# Patient Record
Sex: FEMALE | Race: OTHER
[De-identification: names, ages, dates, MRNs, and addresses within clinical notes are randomized per-mention and may not be internally consistent; named-entity substitution may affect disease eponyms.]

---

## 2018-05-08 ENCOUNTER — HOSPITAL ENCOUNTER (OUTPATIENT)
Dept: HOSPITAL 80 - FED | Age: 28
Setting detail: OBSERVATION
Discharge: HOME | End: 2018-05-08
Attending: ORTHOPAEDIC SURGERY | Admitting: ORTHOPAEDIC SURGERY
Payer: COMMERCIAL

## 2018-05-08 VITALS — DIASTOLIC BLOOD PRESSURE: 75 MMHG | SYSTOLIC BLOOD PRESSURE: 117 MMHG
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
## 2018-05-08 DIAGNOSIS — S52.321B: Primary | ICD-10-CM

## 2018-05-08 DIAGNOSIS — S52.221B: ICD-10-CM
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
## 2018-05-08 LAB — PLATELET # BLD: 255 10^3/UL (ref 150–400)

## 2018-05-08 PROCEDURE — 73090 X-RAY EXAM OF FOREARM: CPT

## 2018-05-08 PROCEDURE — C1713 ANCHOR/SCREW BN/BN,TIS/BN: HCPCS

## 2018-05-08 PROCEDURE — 25505 CLTX RDL SHFT FX W/MNPJ: CPT

## 2018-05-08 PROCEDURE — 11010 DEBRIDE SKIN AT FX SITE: CPT

## 2018-05-08 PROCEDURE — L3980 UP EXT FX ORTHOS HUMERAL NOS: HCPCS

## 2018-05-08 PROCEDURE — G0378 HOSPITAL OBSERVATION PER HR: HCPCS

## 2018-05-08 PROCEDURE — 25575 OPTX RDL&ULN SHFT FX RDS&ULN: CPT

## 2018-05-08 RX ADMIN — HYDROCODONE BITARTRATE AND ACETAMINOPHEN PRN TAB: 5; 325 TABLET ORAL at 15:50

## 2018-05-08 RX ADMIN — Medication PRN MCG: at 15:39

## 2018-05-08 RX ADMIN — HYDROCODONE BITARTRATE AND ACETAMINOPHEN PRN TAB: 5; 325 TABLET ORAL at 16:41

## 2018-05-08 RX ADMIN — Medication PRN MCG: at 15:17

## 2018-05-08 NOTE — POSTANESTH
Post Anesthetic Evaluation


Cardiovascular Status: Similar to Pre-Op Cond


Respiratory Status: Similar to Pre-op Cond.


Level of Consciousness/Mental Status: Alert and Oriented, Mildly Sleepy, 

Arousable


Pain Control: Adequate, Prn Tx Ordered


Nausea/Vomiting Control: Adequate, Prn Tx Ordered


Complications Possibly Related to Anesthesia: None Noted

## 2018-05-08 NOTE — GOP
[f rep st]



                                                                OPERATIVE REPORT





DATE OF OPERATION:  05/08/2018



SURGEON:  Navid Perez MD



ASSISTANT:  Douglas Quiñonez, medically required for positioning of the arm and careful retraction of vit
al neurovascular structures during open reduction and internal fixation of both-bone forearm fracture
.



PREOPERATIVE DIAGNOSIS:  Grade 1 open both-bone forearm fracture with ulnar-sided poke hole.



POSTOPERATIVE DIAGNOSIS:  Grade 1 open both-bone forearm fracture with ulnar-sided poke hole.



PROCEDURE PERFORMED:  

1.  Open reduction and internal fixation of both-bone forearm fracture.

2.  Irrigation and debridement of 1 cm wound, ulnar side, forearm.

3.  Intraoperative fluoroscopy performed and interpreted by surgeon.



FINDINGS:  





INDICATIONS:  27-year-old female had a mechanical fall.  Inside-out poke hole on the ulnar side washe
d out and reduced in the emergency room, presents for definitive irrigation, debridement and fixation
.



DESCRIPTION OF PROCEDURE:  Patient identified in the preoperative holding area. Consent, laterality a
nd preoperative antibiotics were confirmed and delivered.  All questions were answered.  Her  
was at bedside.  She was able to wiggle her 4 fingers.  No pain with passive range of motion of her t
humb or fingers.  Splint was well approximated. 



The patient brought into the operating room, transferred over to the OR table.  General anesthesia.  
All extremities well padded.  Hand table was used.  The right upper extremity was prepped and draped 
in the usual sterile fashion.  Surgical time-out was performed.  The forearm was soft and thus we dec
ided to proceed with the surgery.  There was a 1 cm transverse wound on the volar ulnar aspect of her
 forearm midway down.  We extended this a few millimeters in each direction and washed it out, used a
 small curette, it looked fairly clean.  We used 3000 L of polymyxin bacitracin for this one single w
ound.  Then, we went ahead with a volar Chandler approach.  We made an incision from the mid aspect of h
er forearm up just distal to the antecubital fossa.  Developed the subcutaneous tissue flaps.  Took t
he fascia in line.  We found brachioradialis and the FCR pronator teres interval, and the fracture he
matoma was identified.  The radial nerve was taken radially.  We did not visualize the radial artery 
or the recurrent leash.  We went down to bone and found the supinator and pronator teres, and in supi
nation, we took down the supinator and then with slight pronation, we took down pronator teres and de
veloped the fracture site.  The most distal fragment was attached to pronator teres.  We reduced this
 fracture and placed a 6 hole plate.  Confirmed this under fluoroscopic views.  We washed out the wou
nd with 500 cc of warm normal saline.  Went to the ulnar incision.  Just a little bit distal on the s
ubcutaneous border, we took down FCU to visualize the fracture.  There was a butterfly fragment.  We 
used pointed reduction clamps for provisional fixation and then placed the 6 hole plate.  Final fluor
oscopic views in AP and lateral views show anatomic reduction.  Clinically, she had about 70 degrees 
of supination and approximately 70 degrees of pronation.  We washed out all the wounds with 500 cc of
 warm normal saline, 3-0 Monocryl for closure and 3-0 Prolene for skin.  Mastisol and Steri-Strips we
re used.  Xeroform, 4 x 4's, ABD, and a well-padded sugar-tong splint was used to the metacarpal head
s. 



Tourniquet time was let down after 75 minutes. 



The patient was extubated awake to the PACU in stable condition.



IMPLANTS USED:  Two 6-hole 3.5 LCDC plates.





Job #:  [id number]

## 2018-05-08 NOTE — PDGENHP
History and Physical


History and Physical: 





films reviewed


discussed case with ER


Plan for I and D and ORIF BBFA Fx today

## 2018-05-08 NOTE — GCON
[f rep st]



                                                                    CONSULTATION





ORTHOPEDIC EMERGENCY ROOM CONSULTATION



DATE OF CONSULTATION:  05/08/2018





CHIEF COMPLAINT:  Right forearm pain.



DIAGNOSES:  Grade 1 open both-bone forearm fracture.



HISTORY OF PRESENT ILLNESS:  The patient is a 27-year-old female who, in the middle of the night last
 night, tripped and fell.  Possibly intoxicated.  Unable to use the right arm.  Was seen in the emerg
ency room.  Close reduced.  Found to have some bleeding from a tiny poke hole on the forearm, less th
an 5 mm.  She had antibiotics, tetanus up to date, and washed out in the emergency room and splinted.




ORTHOPEDIC EXAMINATION:  EXTREMITIES:  Reveals a well-approximated splint.  Mobile fingers but painfu
l.  Sensate over her thumb and index finger and 1st dorsal web space.  Brisk cap refill.  Bilateral l
ower extremities, full motion without pain.  Left upper extremity, full motion without pain.  ABDOMEN
:  Soft.  PELVIS:  Stable.



IMAGING:  X-rays reviewed reveal a both-bone forearm fracture.  Slightly proximal to the midshaft.



IMPRESSION/RECOMMENDATION:  Reportedly open both-bone forearm fracture.  Recommend a washout today.  
Plans for open reduction, internal fixation.  However, if she is too swollen, we will go ahead and de
lay this and just treat the open aspect for the time being.  Risks, benefits, expectations, and alter
natives were discussed.  The patient agrees and consents to treatment.  Her  was at the [facility] for questions and answers.





Job #:  [id number]

## 2018-05-08 NOTE — PDANEPAE
ANE History of Present Illness





Forearm fracture from fall





ANE Past Medical History





- Pulmonary History


Hx Oxygen in Use at Home: No


Hx Sleep Apnea: No


Sleep Apnea Screening Result - Last Documented: Positive





- Endocrine History


Hx Diabetes: No





ANE Review of Systems


Review of Systems: 








ANE Patient History





- Allergies


Allergies/Adverse Reactions: 








No Known Allergies Allergy (Unverified 05/08/18 01:01)


 








- Home Medications


Home Medications: 








NK [No Known Home Meds]  05/08/18 [Last Taken Unknown]








- NPO status


NPO Since - Liquids (Date): 05/08/18


NPO Since - Liquids (Time): 03:00


NPO Since - Solids (Date): 05/07/18


NPO Since - Solids (Time): 20:00





- Anes Hx


Anes Hx: no prior problems





- Smoking Hx


Smoking Status: Never smoked





- Alcohol Use


Alcohol Use: Occasionally





ANE Labs/Vital Signs





- Labs


Result Diagrams: 


 05/08/18 10:30





 05/08/18 10:30





- Vital Signs


Blood Pressure: 101/75


Heart Rate: 77


Respiratory Rate: 16


O2 Sat (%): 98


Height: 167.64 cm


Weight: 71 kg





ANE Physical Exam





- Airway


Neck exam: FROM


Mallampati Score: Class 1


Mouth exam: normal dental/mouth exam





- Pulmonary


Pulmonary: no respiratory distress





- Cardiovascular


Cardiovascular: regular rate and rhythym





- ASA Status


ASA Status: II





ANE Anesthesia Plan


Anesthesia Plan: GA w LMA

## 2018-05-08 NOTE — ASMTCMCOM
CM Note

 

CM Note                       

Notes:

Pt had forearm ORIF after a fall at home. Pt resides with spouse, anticipate pt will d/c when 

medically stable. No therapies ordered. CM available for changes/needs. 

 

Date Signed:  05/08/2018 02:50 PM

Electronically Signed By:MAGNOLIA Pink

## 2018-05-08 NOTE — EDPHY
H & P


Stated Complaint: FALL ON RIGHT ARM NOW IN SEVERE PAIN


Time Seen by Provider: 05/08/18 01:20


HPI/ROS: 





HPI


The patient presents with right arm pain after a fall which occurred just prior 

to arrival.  She was walking on a serina surface, tripped and fell, and landed 

on top of her right arm.  She had pain immediately and it was severe and dull.  

She does not have any numbness or tingling of her hand or arm.  She is right-

hand dominant.  She has no prior right arm injuries.





REVIEW OF SYSTEMS


Constitutional:  No fever, no chills.


Eyes:  No discharge.


ENT:  No sore throat.


Cardiovascular:  No chest pain, no palpitations.


Respiratory:  No cough, no shortness of breath.


Gastrointestinal:  No abdominal pain, no vomiting.


Genitourinary:  No hematuria.


Musculoskeletal:  No back pain.


Skin:  No rashes.


Neurological:  No headache.





PMHx:  Healthy





Soc Hx:  Here with friends








PHYSICAL


General Appearance: Alert, no distress


Eyes: Pupils equal and round no pallor or injection


ENT, Mouth: Mucous membranes moist


Respiratory: There are no retractions, lungs are clear to auscultation


Cardiovascular:  Regular rate and rhythm 


Gastrointestinal:  Abdomen is soft and non-tender, no masses, bowel sounds 

normal 


Neurological:  A&O, moves all extremities


Skin:  Warm and dry, no rashes


Musculoskeletal: Neck is supple non tender 


Extremities:  Left forearm is edematous with obvious deformity, there is 

sensation intact to light touch throughout her hand and forearm, there is 2+ 

radial pulses, there is brisk cap refill in her fingers


Psychiatric:  Patient is oriented X 3, there is no agitation 





Source: Patient


Exam Limitations: No limitations





- Personal History


Current Tetanus/Diphtheria Vaccine: Yes


Current Tetanus Diphtheria and Acellular Pertussis (TDAP): Yes





- Medical/Surgical History


Hx Asthma: No


Hx Chronic Respiratory Disease: No


Hx Diabetes: No


Hx Cardiac Disease: No


Hx Renal Disease: No


Hx Cirrhosis: No


Hx Alcoholism: No


Hx HIV/AIDS: No


Hx Splenectomy or Spleen Trauma: No


Other PMH: DENIES





- Social History


Smoking Status: Never smoked


Constitutional: 





 Initial Vital Signs











Temperature (C)  36.4 C   05/08/18 00:59


 


Heart Rate  91   05/08/18 00:59


 


Respiratory Rate  18   05/08/18 00:59


 


Blood Pressure  158/102 H  05/08/18 00:59


 


O2 Sat (%)  97   05/08/18 00:59








 











O2 Delivery Mode [Post         Non-Rebreather Mask





Procedure 1st]                 


 


O2 Delivery Mode [Procedural   Non-Rebreather Mask





2nd]                           


 


O2 Delivery Mode [Procedural   Non-Rebreather Mask





1st]                           


 


O2 Delivery Mode [.Immediate   Non-Rebreather Mask





Pre-Procedure]                 


 


O2 (L/minute) [Post Procedure  15





1st]                           


 


O2 (L/minute) [Procedural 2nd] 15


 


O2 (L/minute) [Procedural 1st] 15


 


O2 (L/minute) [.Immediate Pre- 15





Procedure]                     














Allergies/Adverse Reactions: 


 





No Known Allergies Allergy (Unverified 05/08/18 01:01)


 








Home Medications: 














 Medication  Instructions  Recorded


 


NK [No Known Home Meds]  05/08/18














Medical Decision Making





- Diagnostics


Imaging Results: 





Right forearm x-ray demonstrates displaced and angulated both-bone forearm 

fracture, interpreted by me, radiology interpretation is pending.


Imaging: I viewed and interpreted images myself


Procedures: 





PROCEDURAL SEDATION


Procedure: Procedural sedation.


Indication:  Displaced both-bone forearm fracture





 The patient is an appropriate candidate to tolerate procedural sedation.  The 

patient's vital signs and mental status are appropriate.  The risks, benefits 

and alternatives of the sedation were discussed with the patient.   The patient 

is ASA classification 2.  The patient's Mallampati airway score was 1 and the 

patient [did] meet the 3-3-2 airway measurements.  A time out was completed.  

The patient was sedated with propofol 50 mg. The patient was monitored with 

continuous pulse oximetry, EKG monitor and end tidal CO2.  [There were no 

complications and no significant hypoxemia.]  I performed [both the sedation 

and the procedure.]  The total time I spent at the bedside during the 

procedural sedation was 5 minutes.  The patient was examined after the 

procedural sedation and has returned to their pre-sedation baseline with normal 

vital signs and a normal examination.





REDUCTION


Procedure: Dislocation reduction.


Indication:  Dislocation


The right forearm was reduced in the usual fashion without complications.  Post 

reduction the patient's neurovascular exam is normal.  Post reduction x-ray 

demonstrates reduction of the joint to the anatomic position. The procedure was 

performed by myself.





Differential Diagnosis: 





This is a 27-year-old healthy right-hand-dominant female who presents with a 

fall onto her right arm just prior to arrival.  She has sustained a both-bone 

displaced and angulated forearm fracture.  She is neurovascularly intact.





She was given pain medication with ongoing pain.  Using the ultrasound I 

attempted a supraclavicular brachial plexus block with lidocaine 1% total of 5 

mL is without much improvement in her pain.  Decision was made for procedural 

sedation.  During the reduction a laceration which was not previously noted was 

discovered in her mid forearm anteriorly the measuring approximately 5 mm.  

This raises my suspicion for open fracture.





Because of this, I consulted with the orthopedic surgeon on call Dr. Hall.  We 

plan for Ancef, irrigation of the wound, splint, operation later today.  I have 

ordered the patient a bed in the hospital.  I have explained this diagnosis and 

treatment plan with her.  I have answered all of her questions.





- Data Points


Medications Given: 





 





Cefazolin Sodium (Cefazolin Syringe)  2 gm in 20 mls @ 40 mls/hr IVP EDNOW ONE


   PRN Reason: Protocol


   Stop: 05/08/18 03:59


   Last Admin: 05/08/18 03:21 Dose:  20 mls





Discontinued Medications





Fentanyl (Sublimaze)  50 mcg IVP EDNOW ONE


   Stop: 05/08/18 02:46


   Last Admin: 05/08/18 03:07 Dose:  50 mcg


Hydromorphone HCl (Dilaudid)  1 mg IVP EDNOW ONE


   Stop: 05/08/18 01:09


   Last Admin: 05/08/18 01:13 Dose:  1 mg


Sodium Chloride (Ns)  1,000 mls @ 0 mls/hr IV ONCE ONE


   PRN Reason: Wide Open


   Stop: 05/08/18 01:09


   Last Admin: 05/08/18 01:12 Dose:  1,000 mls


Ondansetron HCl (Zofran)  4 mg IVP EDNOW ONE


   Stop: 05/08/18 01:09


   Last Admin: 05/08/18 01:13 Dose:  4 mg


Propofol (Diprivan)  70 mg IVP EDNOW ONE


   Stop: 05/08/18 02:52


   Last Admin: 05/08/18 02:51 Dose:  70 mg








Departure





- Departure


Disposition: [facility] Inpatient Acute


Clinical Impression: 


Fx radius/ulna shaft-open


Qualifiers:


 Encounter type: initial encounter Open fracture type: open type I or II 

Laterality: right Qualified Code(s): S52.301B - Unspecified fracture of shaft 

of right radius, initial encounter for open fracture type I or II; S52.201B - 

Unspecified fracture of shaft of right ulna, initial encounter for open 

fracture type I or II; S52.201B - Unspecified fracture of shaft of right ulna, 

initial encounter for open fracture type I or II





Condition: Fair


Referrals: 


NONE *PRIMARY CARE P,. [Primary Care Provider] - As per Instructions